# Patient Record
Sex: MALE | Race: WHITE | NOT HISPANIC OR LATINO
[De-identification: names, ages, dates, MRNs, and addresses within clinical notes are randomized per-mention and may not be internally consistent; named-entity substitution may affect disease eponyms.]

---

## 2019-08-08 PROBLEM — Z00.00 ENCOUNTER FOR PREVENTIVE HEALTH EXAMINATION: Status: ACTIVE | Noted: 2019-08-08

## 2019-08-26 ENCOUNTER — RECORD ABSTRACTING (OUTPATIENT)
Age: 74
End: 2019-08-26

## 2019-08-27 ENCOUNTER — APPOINTMENT (OUTPATIENT)
Dept: NEUROSURGERY | Facility: CLINIC | Age: 74
End: 2019-08-27
Payer: MEDICARE

## 2019-08-27 VITALS
SYSTOLIC BLOOD PRESSURE: 151 MMHG | DIASTOLIC BLOOD PRESSURE: 89 MMHG | WEIGHT: 185 LBS | HEIGHT: 68 IN | RESPIRATION RATE: 16 BRPM | BODY MASS INDEX: 28.04 KG/M2 | HEART RATE: 88 BPM

## 2019-08-27 PROCEDURE — 99204 OFFICE O/P NEW MOD 45 MIN: CPT

## 2019-08-30 ENCOUNTER — OUTPATIENT (OUTPATIENT)
Dept: OUTPATIENT SERVICES | Facility: HOSPITAL | Age: 74
LOS: 1 days | End: 2019-08-30
Payer: MEDICARE

## 2019-08-30 VITALS
RESPIRATION RATE: 16 BRPM | WEIGHT: 179.9 LBS | DIASTOLIC BLOOD PRESSURE: 90 MMHG | TEMPERATURE: 98 F | SYSTOLIC BLOOD PRESSURE: 148 MMHG | OXYGEN SATURATION: 96 % | HEIGHT: 63 IN | HEART RATE: 72 BPM

## 2019-08-30 DIAGNOSIS — G89.4 CHRONIC PAIN SYNDROME: ICD-10-CM

## 2019-08-30 DIAGNOSIS — Z01.818 ENCOUNTER FOR OTHER PREPROCEDURAL EXAMINATION: ICD-10-CM

## 2019-08-30 DIAGNOSIS — Z98.890 OTHER SPECIFIED POSTPROCEDURAL STATES: Chronic | ICD-10-CM

## 2019-08-30 DIAGNOSIS — M48.061 SPINAL STENOSIS, LUMBAR REGION WITHOUT NEUROGENIC CLAUDICATION: ICD-10-CM

## 2019-08-30 DIAGNOSIS — I10 ESSENTIAL (PRIMARY) HYPERTENSION: ICD-10-CM

## 2019-08-30 LAB
ANION GAP SERPL CALC-SCNC: 13 MMOL/L — SIGNIFICANT CHANGE UP (ref 5–17)
BUN SERPL-MCNC: 14 MG/DL — SIGNIFICANT CHANGE UP (ref 7–23)
CALCIUM SERPL-MCNC: 9.5 MG/DL — SIGNIFICANT CHANGE UP (ref 8.4–10.5)
CHLORIDE SERPL-SCNC: 97 MMOL/L — SIGNIFICANT CHANGE UP (ref 96–108)
CO2 SERPL-SCNC: 26 MMOL/L — SIGNIFICANT CHANGE UP (ref 22–31)
CREAT SERPL-MCNC: 0.81 MG/DL — SIGNIFICANT CHANGE UP (ref 0.5–1.3)
GLUCOSE SERPL-MCNC: 97 MG/DL — SIGNIFICANT CHANGE UP (ref 70–99)
HCT VFR BLD CALC: 42 % — SIGNIFICANT CHANGE UP (ref 39–50)
HGB BLD-MCNC: 13.9 G/DL — SIGNIFICANT CHANGE UP (ref 13–17)
MCHC RBC-ENTMCNC: 30.9 PG — SIGNIFICANT CHANGE UP (ref 27–34)
MCHC RBC-ENTMCNC: 33.1 GM/DL — SIGNIFICANT CHANGE UP (ref 32–36)
MCV RBC AUTO: 93.3 FL — SIGNIFICANT CHANGE UP (ref 80–100)
PLATELET # BLD AUTO: 210 K/UL — SIGNIFICANT CHANGE UP (ref 150–400)
POTASSIUM SERPL-MCNC: 4.5 MMOL/L — SIGNIFICANT CHANGE UP (ref 3.5–5.3)
POTASSIUM SERPL-SCNC: 4.5 MMOL/L — SIGNIFICANT CHANGE UP (ref 3.5–5.3)
RBC # BLD: 4.5 M/UL — SIGNIFICANT CHANGE UP (ref 4.2–5.8)
RBC # FLD: 11.8 % — SIGNIFICANT CHANGE UP (ref 10.3–14.5)
SODIUM SERPL-SCNC: 136 MMOL/L — SIGNIFICANT CHANGE UP (ref 135–145)
WBC # BLD: 7.84 K/UL — SIGNIFICANT CHANGE UP (ref 3.8–10.5)
WBC # FLD AUTO: 7.84 K/UL — SIGNIFICANT CHANGE UP (ref 3.8–10.5)

## 2019-08-30 PROCEDURE — 85027 COMPLETE CBC AUTOMATED: CPT

## 2019-08-30 PROCEDURE — 80048 BASIC METABOLIC PNL TOTAL CA: CPT

## 2019-08-30 PROCEDURE — 87640 STAPH A DNA AMP PROBE: CPT

## 2019-08-30 PROCEDURE — G0463: CPT

## 2019-08-30 PROCEDURE — 87641 MR-STAPH DNA AMP PROBE: CPT

## 2019-08-30 RX ORDER — SODIUM CHLORIDE 9 MG/ML
3 INJECTION INTRAMUSCULAR; INTRAVENOUS; SUBCUTANEOUS EVERY 8 HOURS
Refills: 0 | Status: DISCONTINUED | OUTPATIENT
Start: 2019-09-12 | End: 2019-09-12

## 2019-08-30 RX ORDER — CEFAZOLIN SODIUM 1 G
2000 VIAL (EA) INJECTION ONCE
Refills: 0 | Status: DISCONTINUED | OUTPATIENT
Start: 2019-09-12 | End: 2019-09-12

## 2019-08-30 RX ORDER — CHLORHEXIDINE GLUCONATE 213 G/1000ML
1 SOLUTION TOPICAL ONCE
Refills: 0 | Status: DISCONTINUED | OUTPATIENT
Start: 2019-09-12 | End: 2019-09-12

## 2019-08-30 RX ORDER — LIDOCAINE HCL 20 MG/ML
0.2 VIAL (ML) INJECTION ONCE
Refills: 0 | Status: DISCONTINUED | OUTPATIENT
Start: 2019-09-12 | End: 2019-09-12

## 2019-08-30 NOTE — H&P PST ADULT - NEGATIVE ENMT SYMPTOMS
no vertigo/no throat pain/no sinus symptoms/no nose bleeds/no nasal congestion no hearing difficulty/no ear pain/no nasal congestion/no throat pain/no nose bleeds/no tinnitus/no vertigo/no sinus symptoms

## 2019-08-30 NOTE — H&P PST ADULT - NSICDXPROBLEM_GEN_ALL_CORE_FT
PROBLEM DIAGNOSES  Problem: Lumbar spinal stenosis  Assessment and Plan: Spinal Cord Stimulator Placement  Pre-op instructions, including Chlorhexidine soap, provided - all questions answered  Continue pain meds as indicated    Problem: Hypertension  Assessment and Plan: Continue Lisinopril as indicated, including am of surgery with sip of water

## 2019-08-30 NOTE — PHYSICAL EXAM
[General Appearance - Alert] : alert [General Appearance - In No Acute Distress] : in no acute distress [General Appearance - Well Nourished] : well nourished [General Appearance - Well Developed] : well developed [Oriented To Time, Place, And Person] : oriented to person, place, and time [Impaired Insight] : insight and judgment were intact [Affect] : the affect was normal [Mood] : the mood was normal [Cranial Nerves Optic (II)] : visual acuity intact bilaterally,  pupils equal round and reactive to light [Cranial Nerves Oculomotor (III)] : extraocular motion intact [Cranial Nerves Facial (VII)] : face symmetrical [Cranial Nerves Trigeminal (V)] : facial sensation intact symmetrically [Cranial Nerves Vestibulocochlear (VIII)] : hearing was intact bilaterally [Cranial Nerves Glossopharyngeal (IX)] : tongue and palate midline [Cranial Nerves Hypoglossal (XII)] : there was no tongue deviation with protrusion [Cranial Nerves Accessory (XI - Cranial And Spinal)] : head turning and shoulder shrug symmetric [Motor Strength] : muscle strength was normal in all four extremities [5] : S1 ankle flexors 5/5 [Sensation Tactile Decrease] : light touch was intact [Limited Balance] : the patient's balance was impaired [No Visual Abnormalities] : no visible abnormailities [Antalgic] : antalgic [Intact] : all sensory within normal limits bilaterally [Hearing Threshold Finger Rub Not [place]] : hearing was normal [PERRL With Normal Accommodation] : pupils were equal in size, round, reactive to light, with normal accommodation [] : no respiratory distress [Neck Appearance] : the appearance of the neck was normal [Respiration, Rhythm And Depth] : normal respiratory rhythm and effort [Auscultation Breath Sounds / Voice Sounds] : lungs were clear to auscultation bilaterally [Heart Sounds] : normal S1 and S2 [Edema] : there was no peripheral edema [Involuntary Movements] : no involuntary movements were seen [Skin Color & Pigmentation] : normal skin color and pigmentation [Motor Tone] : muscle strength and tone were normal [Romberg's Sign] : Romberg's sign was negtive [Tremor] : no tremor present [FreeTextEntry8] : pain of RLE limiting  [Able to toe walk] : the patient was not able to toe walk [Able to heel walk] : the patient was not able to heel walk [FreeTextEntry1] : antalgic, limping gait

## 2019-08-30 NOTE — H&P PST ADULT - NSANTHOSAYNRD_GEN_A_CORE
No. RIOS screening performed.  STOP BANG Legend: 0-2 = LOW Risk; 3-4 = INTERMEDIATE Risk; 5-8 = HIGH Risk

## 2019-08-30 NOTE — HISTORY OF PRESENT ILLNESS
[FreeTextEntry1] : chronic pain [de-identified] : Mr. Mayen is a 73 yo male with PMH of HTN, R knee ruptured patella tendon, B cataracts, and chronic Low back and RLE pain with onset of 15 years who is s/p positive SCS trial on 8/23/19 and arrives for initial consult for permanent placement.  He reports 100 % relief of pain during the SCS trial.  He has been in pain management for 10 years, having received multiple injections with minimal partial relief.  Pain medications are not providing adequate relief.  He is ambulating with a cane which elicits severe pain of RLE. \par \par Pain:\par Low back pain\par Right LE pain - buttock, lateral thigh, slightly in calf\par Aggravated with ambulation/ weight bearing - causes pain in Right LE\par 50% low back, 50% RLE\par \par Pain management:  Dr. Conde has included multiple injections since 2017, and [id number] with another provider.\par PT is not helpful\par Pain medications:  Gabapentin 600 mg 1 tab Q6, Oxycodone 5 mg 1 tab TID\par

## 2019-08-30 NOTE — H&P PST ADULT - ENERGY EXPENDITURE (METS)
4.25 limited 2/2 low back pain, radiates to right LE 4.25 DASI scale, limited 2/2 low back pain, radiates to right LE

## 2019-08-30 NOTE — H&P PST ADULT - HISTORY OF PRESENT ILLNESS
75 yo male, 75 yo male, PMH HTN, HLD chronic low back pain since 1990, has tried epidural injections and PT thorough the years, but now not working and pain is radiating to right lower extremity making it difficult to walk. Pt. presents to PST today for scheduled Spinal Cord Stimulator Placement on 9/12/19. Pt. denies recent fever, chills, chest pain, SOB, changes in bowel/urinary habits.  Pt. will continue aspirin during ernesto-op period

## 2019-08-30 NOTE — H&P PST ADULT - ACTIVITY
Can walk 2 blocks, goes up the stairs one step at a time Can walk 2 blocks with cane, goes up the stairs one step at a time

## 2019-08-30 NOTE — REVIEW OF SYSTEMS
[Leg Weakness] : leg weakness [Abnormal Sensation] : an abnormal sensation [Hypersensitivity] : hypersensitivity [Difficulty Walking] : difficulty walking [Limping] : limping [As Noted in HPI] : as noted in HPI [Limb Pain] : limb pain [Negative] : Endocrine [Abdominal Pain] : no abdominal pain [Vomiting] : no vomiting [Diarrhea] : no diarrhea [Dysuria] : no dysuria [Incontinence] : no incontinence [Skin Lesions] : no skin lesions [Skin Wound] : no skin wound [Itching] : no itching [Easy Bleeding] : no tendency for easy bleeding [Easy Bruising] : no tendency for easy bruising [FreeTextEntry9] : severe RLE pain

## 2019-08-30 NOTE — ASSESSMENT
[FreeTextEntry1] : Mr. Mayen has severe lumbar stenosis causing severe chronic pain.  He has failed conservative pain management including multiple injections over 10 years.  He has failed medication therapy with minimal partial relief. He reports 100 % relief during recent trial of SCS.  He does have some pressure on the nerves but does not wish to have a large back surgery.  He is an excellent candidate for permanent SCS for optimal pain control and improved quality of life since having 100 % relief of pain.  After discussion of the risks and benefits of permanent SCS implant, he wishes to proceed.  We will obtain an MRI of the lumbar spine to evaluate the soft tissue, nerves, and spinal cord (last available from 2012).  \par \par 1)  MRI Lumbar wo - now\par 2)  Schedule SCS permanent / BSI

## 2019-08-31 LAB
MRSA PCR RESULT.: SIGNIFICANT CHANGE UP
S AUREUS DNA NOSE QL NAA+PROBE: SIGNIFICANT CHANGE UP

## 2019-09-09 ENCOUNTER — OUTPATIENT (OUTPATIENT)
Dept: OUTPATIENT SERVICES | Facility: HOSPITAL | Age: 74
LOS: 1 days | End: 2019-09-09
Payer: MEDICARE

## 2019-09-09 ENCOUNTER — APPOINTMENT (OUTPATIENT)
Dept: MRI IMAGING | Facility: CLINIC | Age: 74
End: 2019-09-09

## 2019-09-09 DIAGNOSIS — Z98.890 OTHER SPECIFIED POSTPROCEDURAL STATES: Chronic | ICD-10-CM

## 2019-09-09 DIAGNOSIS — M47.26 OTHER SPONDYLOSIS WITH RADICULOPATHY, LUMBAR REGION: ICD-10-CM

## 2019-09-09 DIAGNOSIS — Z00.8 ENCOUNTER FOR OTHER GENERAL EXAMINATION: ICD-10-CM

## 2019-09-09 DIAGNOSIS — M51.16 INTERVERTEBRAL DISC DISORDERS WITH RADICULOPATHY, LUMBAR REGION: ICD-10-CM

## 2019-09-09 PROBLEM — M48.061 SPINAL STENOSIS, LUMBAR REGION WITHOUT NEUROGENIC CLAUDICATION: Chronic | Status: ACTIVE | Noted: 2019-08-30

## 2019-09-09 PROBLEM — M54.16 RADICULOPATHY, LUMBAR REGION: Chronic | Status: ACTIVE | Noted: 2019-08-30

## 2019-09-09 PROCEDURE — 72148 MRI LUMBAR SPINE W/O DYE: CPT | Mod: 26

## 2019-09-09 PROCEDURE — 72148 MRI LUMBAR SPINE W/O DYE: CPT

## 2019-09-11 ENCOUNTER — TRANSCRIPTION ENCOUNTER (OUTPATIENT)
Age: 74
End: 2019-09-11

## 2019-09-12 ENCOUNTER — APPOINTMENT (OUTPATIENT)
Dept: NEUROSURGERY | Facility: HOSPITAL | Age: 74
End: 2019-09-12

## 2019-09-12 ENCOUNTER — OUTPATIENT (OUTPATIENT)
Dept: INPATIENT UNIT | Facility: HOSPITAL | Age: 74
LOS: 1 days | Discharge: HOME | End: 2019-09-12
Payer: MEDICARE

## 2019-09-12 VITALS
DIASTOLIC BLOOD PRESSURE: 84 MMHG | HEART RATE: 77 BPM | SYSTOLIC BLOOD PRESSURE: 166 MMHG | OXYGEN SATURATION: 97 % | HEIGHT: 63 IN | TEMPERATURE: 98 F | WEIGHT: 179.9 LBS | RESPIRATION RATE: 18 BRPM

## 2019-09-12 VITALS
RESPIRATION RATE: 18 BRPM | TEMPERATURE: 98 F | HEART RATE: 78 BPM | SYSTOLIC BLOOD PRESSURE: 143 MMHG | DIASTOLIC BLOOD PRESSURE: 69 MMHG | OXYGEN SATURATION: 96 %

## 2019-09-12 DIAGNOSIS — Z98.890 OTHER SPECIFIED POSTPROCEDURAL STATES: Chronic | ICD-10-CM

## 2019-09-12 DIAGNOSIS — G89.4 CHRONIC PAIN SYNDROME: ICD-10-CM

## 2019-09-12 RX ORDER — LISINOPRIL 2.5 MG/1
20 TABLET ORAL DAILY
Refills: 0 | Status: DISCONTINUED | OUTPATIENT
Start: 2019-09-12 | End: 2019-09-12

## 2019-09-12 RX ORDER — ASPIRIN/CALCIUM CARB/MAGNESIUM 324 MG
81 TABLET ORAL
Qty: 0 | Refills: 0 | DISCHARGE

## 2019-09-12 RX ORDER — ATORVASTATIN CALCIUM 80 MG/1
1 TABLET, FILM COATED ORAL
Qty: 0 | Refills: 0 | DISCHARGE

## 2019-09-12 RX ORDER — AZTREONAM 2 G
160 VIAL (EA) INJECTION
Qty: 1600 | Refills: 0
Start: 2019-09-12 | End: 2019-09-16

## 2019-09-12 RX ORDER — GABAPENTIN 400 MG/1
600 CAPSULE ORAL
Refills: 0 | Status: DISCONTINUED | OUTPATIENT
Start: 2019-09-12 | End: 2019-09-12

## 2019-09-12 RX ORDER — LISINOPRIL 2.5 MG/1
1 TABLET ORAL
Qty: 0 | Refills: 0 | DISCHARGE

## 2019-09-12 RX ORDER — HYDROMORPHONE HYDROCHLORIDE 2 MG/ML
0.5 INJECTION INTRAMUSCULAR; INTRAVENOUS; SUBCUTANEOUS
Refills: 0 | Status: DISCONTINUED | OUTPATIENT
Start: 2019-09-12 | End: 2019-09-12

## 2019-09-12 RX ORDER — OXYCODONE AND ACETAMINOPHEN 5; 325 MG/1; MG/1
1 TABLET ORAL EVERY 6 HOURS
Refills: 0 | Status: DISCONTINUED | OUTPATIENT
Start: 2019-09-12 | End: 2019-09-12

## 2019-09-12 RX ORDER — SODIUM CHLORIDE 9 MG/ML
1000 INJECTION INTRAMUSCULAR; INTRAVENOUS; SUBCUTANEOUS
Refills: 0 | Status: DISCONTINUED | OUTPATIENT
Start: 2019-09-12 | End: 2019-09-12

## 2019-09-12 RX ORDER — ATORVASTATIN CALCIUM 80 MG/1
10 TABLET, FILM COATED ORAL AT BEDTIME
Refills: 0 | Status: DISCONTINUED | OUTPATIENT
Start: 2019-09-12 | End: 2019-09-12

## 2019-09-12 RX ORDER — GABAPENTIN 400 MG/1
1 CAPSULE ORAL
Qty: 0 | Refills: 0 | DISCHARGE

## 2019-09-12 RX ADMIN — SODIUM CHLORIDE 75 MILLILITER(S): 9 INJECTION INTRAMUSCULAR; INTRAVENOUS; SUBCUTANEOUS at 10:45

## 2019-09-12 NOTE — ASU DISCHARGE PLAN (ADULT/PEDIATRIC) - ASU DC SPECIAL INSTRUCTIONSFT
No strenuous activity. No heavy lifting. Do not return to work until cleared by physician. No driving until cleared by physician.  You may remove any dressing and shower on the 3rd day after you surgery.  No soaking in tub, do not remove steri strips. Do not apply any ointments to incision.    Call your Neurosurgeon for appointment in 1 week for wound check.  Follow up with your Private MD in 1-2 weeks.  Return to Emergency Department or contact your Neurosurgeon if any changes in mental status, weakness, numbness or tingling of extremities; difficulty swallowing; drainage or redness of wound, fever; pain in legs; difficulty urinating or constipation.  Do NOT restart your Aspirin or take any Motrin/NSAIDS until checking with your Neurosurgeon and instructed to do so.

## 2019-09-13 PROCEDURE — 63685 INS/RPLC SPI NPG/RCVR POCKET: CPT

## 2019-09-13 PROCEDURE — C1778: CPT

## 2019-09-13 PROCEDURE — 72148 MRI LUMBAR SPINE W/O DYE: CPT

## 2019-09-13 PROCEDURE — C1889: CPT

## 2019-09-13 PROCEDURE — C1820: CPT

## 2019-09-13 PROCEDURE — 63650 IMPLANT NEUROELECTRODES: CPT

## 2019-09-13 PROCEDURE — C1787: CPT

## 2019-09-13 PROCEDURE — 76000 FLUOROSCOPY <1 HR PHYS/QHP: CPT

## 2019-09-17 ENCOUNTER — APPOINTMENT (OUTPATIENT)
Dept: NEUROSURGERY | Facility: CLINIC | Age: 74
End: 2019-09-17
Payer: MEDICARE

## 2019-09-17 VITALS
HEIGHT: 68 IN | WEIGHT: 185 LBS | HEART RATE: 81 BPM | DIASTOLIC BLOOD PRESSURE: 72 MMHG | BODY MASS INDEX: 28.04 KG/M2 | SYSTOLIC BLOOD PRESSURE: 146 MMHG

## 2019-09-17 DIAGNOSIS — M54.16 RADICULOPATHY, LUMBAR REGION: ICD-10-CM

## 2019-09-17 DIAGNOSIS — Z87.891 PERSONAL HISTORY OF NICOTINE DEPENDENCE: ICD-10-CM

## 2019-09-17 DIAGNOSIS — M48.062 SPINAL STENOSIS, LUMBAR REGION WITH NEUROGENIC CLAUDICATION: ICD-10-CM

## 2019-09-17 DIAGNOSIS — Z98.890 OTHER SPECIFIED POSTPROCEDURAL STATES: ICD-10-CM

## 2019-09-17 PROCEDURE — 99024 POSTOP FOLLOW-UP VISIT: CPT

## 2019-09-17 PROCEDURE — 95972 ALYS CPLX SP/PN NPGT W/PRGRM: CPT

## 2019-09-24 PROBLEM — Z98.890 S/P INSERTION OF SPINAL CORD STIMULATOR: Status: ACTIVE | Noted: 2019-09-17

## 2019-09-24 PROBLEM — M54.16 LUMBAR RADICULOPATHY, CHRONIC: Status: ACTIVE | Noted: 2019-08-27

## 2019-09-24 PROBLEM — M48.062 SPINAL STENOSIS OF LUMBAR REGION WITH NEUROGENIC CLAUDICATION: Status: ACTIVE | Noted: 2019-08-27

## 2019-09-24 PROBLEM — Z87.891 FORMER SMOKER: Status: ACTIVE | Noted: 2019-08-27

## 2019-09-27 NOTE — REVIEW OF SYSTEMS
[Abnormal Sensation] : an abnormal sensation [Leg Weakness] : leg weakness [Difficulty Walking] : difficulty walking [Hypersensitivity] : hypersensitivity [Limping] : limping [As Noted in HPI] : as noted in HPI [Limb Pain] : limb pain [Negative] : Endocrine [Abdominal Pain] : no abdominal pain [Vomiting] : no vomiting [Diarrhea] : no diarrhea [Dysuria] : no dysuria [Incontinence] : no incontinence [Skin Lesions] : no skin lesions [Skin Wound] : no skin wound [Itching] : no itching [Easy Bleeding] : no tendency for easy bleeding [Easy Bruising] : no tendency for easy bruising [FreeTextEntry9] : severe RLE pain

## 2019-09-27 NOTE — HISTORY OF PRESENT ILLNESS
[> 3 months] : more  than 3 months [FreeTextEntry1] : chronic pain [de-identified] : Mr. Mayen is a 75 yo male with PMH of HTN, R knee ruptured patella tendon, B cataracts, chronic Low back and RLE pain with onset of 15 years who is s/p  9/12/19 SCS perm/ perc/ BSI leads @ T8/ Left Flank IPG.  Initial programming to be done today with rep present.  The surgical incisions are clean, cry, intact, well approximated, with prineo/dermabond intact, without signs of irritation, erythema, edema, warmth, or drainage.  \par \par He reported 100 % relief of pain during the SCS trial.  He has been in pain management for 10 years, having received multiple injections with minimal partial relief.  Pain medications are not providing adequate relief.  He is ambulating with a cane which elicits severe pain of RLE. \par \par Baseline Pain:\par Low back pain\par Right LE pain - buttock, lateral thigh, slightly in calf\par Aggravated with ambulation/ weight bearing - causes pain in Right LE\par 50% low back, 50% RLE\par \par Pain management:  Dr. Coned has included multiple injections since 2017, and [id number] with another provider.\par PT is not helpful\par Pain medications:  Gabapentin 600 mg 1 tab Q6, Oxycodone 5 mg 1 tab TID\par

## 2019-09-27 NOTE — ASSESSMENT
[FreeTextEntry1] : Mr. Mayen's surgical incisions remain intact, with prineo/dermabond intact and reviewed standard post op wound care instructions as given upon discharge as well from surgery.  Initial programming today with BSI rep with good results as expected.  He knows to keep scheduled f/u with pain management as well for medical management and ongoing programming as needed.  We are available for any issues.  We will reassess in 3 months and check the incisions at that time.  We will also obtain Xrays to confirm lead placement in 3 months.\par \par 1)  Xrays Thoracic A/P Lat - 3 months\par 2)_RTO 3 months

## 2019-09-27 NOTE — PHYSICAL EXAM
[General Appearance - Alert] : alert [General Appearance - In No Acute Distress] : in no acute distress [General Appearance - Well Developed] : well developed [Oriented To Time, Place, And Person] : oriented to person, place, and time [General Appearance - Well Nourished] : well nourished [Affect] : the affect was normal [Impaired Insight] : insight and judgment were intact [Mood] : the mood was normal [Cranial Nerves Oculomotor (III)] : extraocular motion intact [Cranial Nerves Optic (II)] : visual acuity intact bilaterally,  pupils equal round and reactive to light [Cranial Nerves Facial (VII)] : face symmetrical [Cranial Nerves Trigeminal (V)] : facial sensation intact symmetrically [Cranial Nerves Vestibulocochlear (VIII)] : hearing was intact bilaterally [Cranial Nerves Glossopharyngeal (IX)] : tongue and palate midline [Cranial Nerves Accessory (XI - Cranial And Spinal)] : head turning and shoulder shrug symmetric [Cranial Nerves Hypoglossal (XII)] : there was no tongue deviation with protrusion [Motor Strength] : muscle strength was normal in all four extremities [Sensation Tactile Decrease] : light touch was intact [5] : S1 ankle flexors 5/5 [Limited Balance] : the patient's balance was impaired [Antalgic] : antalgic [No Visual Abnormalities] : no visible abnormailities [Intact] : all motor groups within normal limits of strength and tone bilaterally [Hearing Threshold Finger Rub Not [place]] : hearing was normal [PERRL With Normal Accommodation] : pupils were equal in size, round, reactive to light, with normal accommodation [Neck Appearance] : the appearance of the neck was normal [] : no respiratory distress [Respiration, Rhythm And Depth] : normal respiratory rhythm and effort [Auscultation Breath Sounds / Voice Sounds] : lungs were clear to auscultation bilaterally [Heart Sounds] : normal S1 and S2 [Edema] : there was no peripheral edema [Involuntary Movements] : no involuntary movements were seen [Motor Tone] : muscle strength and tone were normal [Skin Color & Pigmentation] : normal skin color and pigmentation [Romberg's Sign] : Romberg's sign was negtive [Tremor] : no tremor present [FreeTextEntry8] : pain of RLE limiting  [Able to toe walk] : the patient was not able to toe walk [Able to heel walk] : the patient was not able to heel walk [FreeTextEntry1] : antalgic, limping gait

## 2019-12-09 ENCOUNTER — FORM ENCOUNTER (OUTPATIENT)
Age: 74
End: 2019-12-09

## 2019-12-10 ENCOUNTER — OUTPATIENT (OUTPATIENT)
Dept: OUTPATIENT SERVICES | Facility: HOSPITAL | Age: 74
LOS: 1 days | End: 2019-12-10
Payer: MEDICARE

## 2019-12-10 ENCOUNTER — APPOINTMENT (OUTPATIENT)
Dept: RADIOLOGY | Facility: CLINIC | Age: 74
End: 2019-12-10
Payer: MEDICARE

## 2019-12-10 DIAGNOSIS — Z98.890 OTHER SPECIFIED POSTPROCEDURAL STATES: Chronic | ICD-10-CM

## 2019-12-10 DIAGNOSIS — Z00.8 ENCOUNTER FOR OTHER GENERAL EXAMINATION: ICD-10-CM

## 2019-12-10 PROCEDURE — 72070 X-RAY EXAM THORAC SPINE 2VWS: CPT

## 2019-12-10 PROCEDURE — 72070 X-RAY EXAM THORAC SPINE 2VWS: CPT | Mod: 26

## 2019-12-12 ENCOUNTER — OTHER (OUTPATIENT)
Age: 74
End: 2019-12-12

## 2019-12-12 NOTE — REASON FOR VISIT
[Follow-Up: _____] : a [unfilled] follow-up visit [FreeTextEntry1] : 3 month post op follow up for permanent SCS implant (BSI @ T8) to review X-ray T-spine (on 12/10/19 in PACS)

## 2019-12-12 NOTE — HISTORY OF PRESENT ILLNESS
[> 3 months] : more  than 3 months [FreeTextEntry1] : chronic pain [de-identified] : Mr. Mayen is a 75 yo male with PMH of HTN, R knee ruptured patella tendon, B cataracts, chronic Low back and RLE pain with onset of 15 years who is s/p  9/12/19 SCS perm/ perc/ BSI leads @ T8/ Left Flank IPG.  He returns today for 3 months follow up\par \par He reported 100 % relief of pain during the SCS trial.  He has been in pain management for 10 years, having received multiple injections with minimal partial relief.  Pain medications are not providing adequate relief.  He is ambulating with a cane which elicits severe pain of RLE. \par \par Baseline Pain:\par Low back pain\par Right LE pain - buttock, lateral thigh, slightly in calf\par Aggravated with ambulation/ weight bearing - causes pain in Right LE\par 50% low back, 50% RLE\par \par Pain management:  Dr. Conde has included multiple injections since 2017, and [id number] with another provider.\par PT is not helpful\par Pain medications:  Gabapentin 600 mg 1 tab Q6, Oxycodone 5 mg 1 tab TID\par

## 2019-12-17 ENCOUNTER — APPOINTMENT (OUTPATIENT)
Dept: NEUROSURGERY | Facility: CLINIC | Age: 74
End: 2019-12-17
Payer: MEDICARE

## 2019-12-30 ENCOUNTER — APPOINTMENT (OUTPATIENT)
Dept: NEUROSURGERY | Facility: CLINIC | Age: 74
End: 2019-12-30
Payer: MEDICARE

## 2019-12-30 PROCEDURE — 99212 OFFICE O/P EST SF 10 MIN: CPT

## 2022-01-27 NOTE — PRE-OP CHECKLIST - 1.
Preop teaching done and emotional support provided to patient and family. Safety provided. Metronidazole Pregnancy And Lactation Text: This medication is Pregnancy Category B and considered safe during pregnancy.  It is also excreted in breast milk.

## 2024-08-08 ENCOUNTER — APPOINTMENT (OUTPATIENT)
Dept: PULMONOLOGY | Facility: CLINIC | Age: 79
End: 2024-08-08

## 2024-08-08 PROBLEM — Z86.39 HISTORY OF HYPERCHOLESTEROLEMIA: Status: RESOLVED | Noted: 2024-08-08 | Resolved: 2024-08-08

## 2024-08-08 PROCEDURE — G2211 COMPLEX E/M VISIT ADD ON: CPT

## 2024-08-08 PROCEDURE — 99204 OFFICE O/P NEW MOD 45 MIN: CPT

## 2024-08-08 NOTE — PROCEDURE
[FreeTextEntry1] : CT CHEST WO IV CONTRAST: 7/10/2024 12:40 PM INDICATION: Pneumonia, unspecified organism. Other specified pleural conditions. Fluid in chest cavity associated with lung infection, dysphagia TECHNIQUE: CT of the chest without intravenous contrast was performed using highresolution protocol in the axial plane with coronal and sagittal reconstructions. COMPARISON: Comparison made with previous examination(s) dated (CR chest xray images only without report) 2020 FINDINGS: Please note that absence of intravenous contrast decreases sensitivity for certain pathology. CHEST: TRACHEOBRONCHIAL TREE: Patent and normal in caliber. LUNGS: There is a 2 mm rounded noncalcified nodule lateral left lung base, series 4 image 108. There is atelectatic changes in the lingula with somewhat subsegmental consolidative lung segment with air bronchograms posterior laterally, series 4 image 104. There is an area of discoid linear atelectasis and/or scarring anterior medial right upper chest. PLEURA: No pleural effusion or pneumothorax. HEART: Normal in size. No pericardial effusion. Extensive calcified coronary arterial atheromatous plaque. VESSELS: Mild calcified atheromatous plaque changes thoracic aorta fusiform dilation 4 cm in short axis ascending aorta at the level of the pulmonary outflow tract. LYMPH NODES: No suspicious lymphadenopathy, noting limited evaluation of the tommie without contrast. THYROID: Visualized portions unremarkable. ESOPHAGUS: Normal in caliber and wall thickness. UPPER ABDOMEN: Several calcified stones collecting in the dependent gallbladder with size up to 8 mm. There is no gallbladder wall thickening or pericholecystic fat stranding. There is a simple cyst projecting from the upper left kidney 2.5 cm in size. Pancreas is fatty replaced atrophic. There is somewhat amorphous nodular thickening of the left Report title: CT CT CHEST WO IV CONTRAST - 2024 5:58 am Patient Id: [id number][DEFAULT] Patient name: Madan Mayen :  Exam date: 7/10/2024 12:40 pm Report status: Final 24, 12:43 PM [facility] Viewer [email or web address] [email or web address] adrenal gland without definite contour deforming mass. MUSCULOSKELETAL: No suspicious osseous lesions. Marked degenerative changes of the spine. IMPRESSION: Atelectatic, subsegmental consolidative lung changes lingula. The change of appearance of which is indeterminate. Consider progress exam in follow-up. There is no pleural effusion. Tiny noncalcified nodule lateral left lung base at present too small to adequately characterize. Appropriate follow-up as per Fleischner guidelines. Discoid atelectasis and/or scarring anterior medial right upper lung. Calcified coronary arterial atheromatous plaque. Heart size is not enlarged. Fusiform dilation ascending thoracic aorta 4 cm in short axis. Correlate with echocardiography. Noncomplicated calcified gallstones. Simple cyst upper posterior left kidney. Electronically signed: 2024 05:58 AM Lupillo Pham MD Ref. Physician: DAMARIS GOEL MD NPI: [id number] data reviewed:

## 2024-08-08 NOTE — PHYSICAL EXAM
[No Acute Distress] : no acute distress [No Resp Distress] : no resp distress [No Acc Muscle Use] : no acc muscle use [Clear to Auscultation Bilaterally] : clear to auscultation bilaterally [Oriented x3] : oriented x3 Patient was BIB parents to ED. She appeared disorganized and agitated and required IM PRN Haldol 5, Ativan 2, Benadryl 50.   Vital Signs Last 24 Hrs  T(C): 37.7 (29 Nov 2019 05:47), Max: 37.7 (29 Nov 2019 05:47)  T(F): 99.9 (29 Nov 2019 05:47), Max: 99.9 (29 Nov 2019 05:47)  HR: 96 (29 Nov 2019 06:36) (95 - 96)  BP: 153/99 (29 Nov 2019 06:36) (153/99 - 154/83)  BP(mean): --  RR: 20 (29 Nov 2019 06:36) (17 - 20)  SpO2: 100% (29 Nov 2019 06:36) (100% - 100%) asthma single. 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 2 younger sisters.

## 2024-08-08 NOTE — HISTORY OF PRESENT ILLNESS
[>= 20 pack years] : >= 20 pack years [TextBox_4] : JAYLYN HERNANDEZ is a 79 year old male who presents with son widenice abnormal CT chest PMH: HTN, back pain, dysphagia,  s/p egd and EUS with dilation by GI , distal esophageal narrowin g feb 2024  told he had 'water in chest'  ct chest proheatlh done  heavy smoker 4 packs X 20 years, quit 40 years ago  [TextBox_11] : 4 [TextBox_13] : 20 [YearQuit] : 1980

## 2024-09-26 ENCOUNTER — APPOINTMENT (OUTPATIENT)
Dept: PULMONOLOGY | Facility: CLINIC | Age: 79
End: 2024-09-26
Payer: MEDICARE

## 2024-09-26 VITALS
SYSTOLIC BLOOD PRESSURE: 170 MMHG | OXYGEN SATURATION: 97 % | WEIGHT: 160 LBS | BODY MASS INDEX: 25.11 KG/M2 | TEMPERATURE: 98.2 F | HEIGHT: 67 IN | HEART RATE: 75 BPM | DIASTOLIC BLOOD PRESSURE: 78 MMHG

## 2024-09-26 DIAGNOSIS — R93.89 ABNORMAL FINDINGS ON DIAGNOSTIC IMAGING OF OTHER SPECIFIED BODY STRUCTURES: ICD-10-CM

## 2024-09-26 DIAGNOSIS — J44.9 CHRONIC OBSTRUCTIVE PULMONARY DISEASE, UNSPECIFIED: ICD-10-CM

## 2024-09-26 PROCEDURE — 99214 OFFICE O/P EST MOD 30 MIN: CPT | Mod: 25

## 2024-09-26 PROCEDURE — 94010 BREATHING CAPACITY TEST: CPT

## 2024-09-26 NOTE — HISTORY OF PRESENT ILLNESS
[>= 20 pack years] : >= 20 pack years [TextBox_4] : JAYLYN HERNANDEZ is a 79 year old male who presents with son for f/u  seen for abnormal CT 1 moth ago PMH: HTN, back pain, dysphagia,  s/p egd and EUS with dilation by GI , distal esophageal narrowin g feb 2024   ct chest proheatlh done  heavy smoker 4 packs X 20 years, quit 40 years ago  [TextBox_11] : 4 [TextBox_13] : 20 [YearQuit] : 1980

## 2024-09-26 NOTE — PROCEDURE
[FreeTextEntry1] : 2024- obstruction mild/modreate   CT CHEST WO IV CONTRAST: 7/10/2024 12:40 PM INDICATION: Pneumonia, unspecified organism. Other specified pleural conditions. Fluid in chest cavity associated with lung infection, dysphagia TECHNIQUE: CT of the chest without intravenous contrast was performed using highresolution protocol in the axial plane with coronal and sagittal reconstructions. COMPARISON: Comparison made with previous examination(s) dated (CR chest xray images only without report) 2020 FINDINGS: Please note that absence of intravenous contrast decreases sensitivity for certain pathology. CHEST: TRACHEOBRONCHIAL TREE: Patent and normal in caliber. LUNGS: There is a 2 mm rounded noncalcified nodule lateral left lung base, series 4 image 108. There is atelectatic changes in the lingula with somewhat subsegmental consolidative lung segment with air bronchograms posterior laterally, series 4 image 104. There is an area of discoid linear atelectasis and/or scarring anterior medial right upper chest. PLEURA: No pleural effusion or pneumothorax. HEART: Normal in size. No pericardial effusion. Extensive calcified coronary arterial atheromatous plaque. VESSELS: Mild calcified atheromatous plaque changes thoracic aorta fusiform dilation 4 cm in short axis ascending aorta at the level of the pulmonary outflow tract. LYMPH NODES: No suspicious lymphadenopathy, noting limited evaluation of the tommie without contrast. THYROID: Visualized portions unremarkable. ESOPHAGUS: Normal in caliber and wall thickness. UPPER ABDOMEN: Several calcified stones collecting in the dependent gallbladder with size up to 8 mm. There is no gallbladder wall thickening or pericholecystic fat stranding. There is a simple cyst projecting from the upper left kidney 2.5 cm in size. Pancreas is fatty replaced atrophic. There is somewhat amorphous nodular thickening of the left Report title: CT CT CHEST WO IV CONTRAST - 2024 5:58 am Patient Id: [id number][DEFAULT] Patient name: Madan Mayen :  Exam date: 7/10/2024 12:40 pm Report status: Final 24, 12:43 PM Change [employer] Viewer [email or web address] [email or web address] adrenal gland without definite contour deforming mass. MUSCULOSKELETAL: No suspicious osseous lesions. Marked degenerative changes of the spine. IMPRESSION: Atelectatic, subsegmental consolidative lung changes lingula. The change of appearance of which is indeterminate. Consider progress exam in follow-up. There is no pleural effusion. Tiny noncalcified nodule lateral left lung base at present too small to adequately characterize. Appropriate follow-up as per Fleischner guidelines. Discoid atelectasis and/or scarring anterior medial right upper lung. Calcified coronary arterial atheromatous plaque. Heart size is not enlarged. Fusiform dilation ascending thoracic aorta 4 cm in short axis. Correlate with echocardiography. Noncomplicated calcified gallstones. Simple cyst upper posterior left kidney. Electronically signed: 2024 05:58 AM Lupillo Pham MD Ref. Physician: DAMARIS GOEL MD NPI: [id number] data reviewed:

## 2024-09-27 RX ORDER — UMECLIDINIUM BROMIDE AND VILANTEROL TRIFENATATE 62.5; 25 UG/1; UG/1
62.5-25 POWDER RESPIRATORY (INHALATION)
Qty: 180 | Refills: 0 | Status: ACTIVE | COMMUNITY
Start: 2024-09-26 | End: 1900-01-01

## 2024-10-11 ENCOUNTER — NON-APPOINTMENT (OUTPATIENT)
Age: 79
End: 2024-10-11

## 2024-10-14 ENCOUNTER — RX RENEWAL (OUTPATIENT)
Age: 79
End: 2024-10-14

## 2024-10-16 ENCOUNTER — RX RENEWAL (OUTPATIENT)
Age: 79
End: 2024-10-16

## 2024-10-16 RX ORDER — FLUTICASONE PROPIONATE AND SALMETEROL 250; 50 UG/1; UG/1
250-50 POWDER RESPIRATORY (INHALATION)
Qty: 180 | Refills: 0 | Status: ACTIVE | COMMUNITY
Start: 2024-10-12 | End: 1900-01-01

## 2025-06-28 NOTE — ASU DISCHARGE PLAN (ADULT/PEDIATRIC) - CARE PROVIDER_API CALL
Goal Outcome Evaluation:         Lavelle Zambrano will sleep well tonight.                      Clifford Olguin)  Neurosurgery  General  [address] [facility], [address]  [place] [address]  Phone: [phone]  Fax: [phone]  Follow Up Time:

## 2025-07-01 ENCOUNTER — OUTPATIENT (OUTPATIENT)
Dept: OUTPATIENT SERVICES | Facility: HOSPITAL | Age: 80
LOS: 1 days | End: 2025-07-01
Payer: MEDICARE

## 2025-07-01 ENCOUNTER — TRANSCRIPTION ENCOUNTER (OUTPATIENT)
Age: 80
End: 2025-07-01

## 2025-07-01 VITALS
TEMPERATURE: 98 F | DIASTOLIC BLOOD PRESSURE: 84 MMHG | OXYGEN SATURATION: 98 % | HEART RATE: 71 BPM | HEIGHT: 67 IN | WEIGHT: 179.02 LBS | SYSTOLIC BLOOD PRESSURE: 184 MMHG | RESPIRATION RATE: 16 BRPM

## 2025-07-01 VITALS
RESPIRATION RATE: 17 BRPM | HEART RATE: 75 BPM | SYSTOLIC BLOOD PRESSURE: 158 MMHG | DIASTOLIC BLOOD PRESSURE: 75 MMHG | OXYGEN SATURATION: 97 %

## 2025-07-01 DIAGNOSIS — R94.39 ABNORMAL RESULT OF OTHER CARDIOVASCULAR FUNCTION STUDY: ICD-10-CM

## 2025-07-01 DIAGNOSIS — Z98.890 OTHER SPECIFIED POSTPROCEDURAL STATES: Chronic | ICD-10-CM

## 2025-07-01 LAB
ANION GAP SERPL CALC-SCNC: 16 MMOL/L — SIGNIFICANT CHANGE UP (ref 5–17)
BUN SERPL-MCNC: 16 MG/DL — SIGNIFICANT CHANGE UP (ref 7–23)
CALCIUM SERPL-MCNC: 8.9 MG/DL — SIGNIFICANT CHANGE UP (ref 8.4–10.5)
CHLORIDE SERPL-SCNC: 97 MMOL/L — SIGNIFICANT CHANGE UP (ref 96–108)
CO2 SERPL-SCNC: 28 MMOL/L — SIGNIFICANT CHANGE UP (ref 22–31)
CREAT SERPL-MCNC: 1.05 MG/DL — SIGNIFICANT CHANGE UP (ref 0.5–1.3)
EGFR: 71 ML/MIN/1.73M2 — SIGNIFICANT CHANGE UP
EGFR: 71 ML/MIN/1.73M2 — SIGNIFICANT CHANGE UP
GLUCOSE SERPL-MCNC: 105 MG/DL — HIGH (ref 70–99)
HCT VFR BLD CALC: 37.1 % — LOW (ref 39–50)
HGB BLD-MCNC: 12.3 G/DL — LOW (ref 13–17)
MCHC RBC-ENTMCNC: 29.5 PG — SIGNIFICANT CHANGE UP (ref 27–34)
MCHC RBC-ENTMCNC: 33.2 G/DL — SIGNIFICANT CHANGE UP (ref 32–36)
MCV RBC AUTO: 89 FL — SIGNIFICANT CHANGE UP (ref 80–100)
NRBC # BLD AUTO: 0 K/UL — SIGNIFICANT CHANGE UP (ref 0–0)
NRBC # FLD: 0 K/UL — SIGNIFICANT CHANGE UP (ref 0–0)
NRBC BLD AUTO-RTO: 0 /100 WBCS — SIGNIFICANT CHANGE UP (ref 0–0)
PLATELET # BLD AUTO: 181 K/UL — SIGNIFICANT CHANGE UP (ref 150–400)
PMV BLD: 9.9 FL — SIGNIFICANT CHANGE UP (ref 7–13)
POTASSIUM SERPL-MCNC: 2.8 MMOL/L — CRITICAL LOW (ref 3.5–5.3)
POTASSIUM SERPL-SCNC: 2.8 MMOL/L — CRITICAL LOW (ref 3.5–5.3)
RBC # BLD: 4.17 M/UL — LOW (ref 4.2–5.8)
RBC # FLD: 12.8 % — SIGNIFICANT CHANGE UP (ref 10.3–14.5)
SODIUM SERPL-SCNC: 141 MMOL/L — SIGNIFICANT CHANGE UP (ref 135–145)
WBC # BLD: 6.31 K/UL — SIGNIFICANT CHANGE UP (ref 3.8–10.5)
WBC # FLD AUTO: 6.31 K/UL — SIGNIFICANT CHANGE UP (ref 3.8–10.5)

## 2025-07-01 PROCEDURE — 93010 ELECTROCARDIOGRAM REPORT: CPT

## 2025-07-01 PROCEDURE — 93005 ELECTROCARDIOGRAM TRACING: CPT

## 2025-07-01 PROCEDURE — 93454 CORONARY ARTERY ANGIO S&I: CPT | Mod: 26

## 2025-07-01 PROCEDURE — 99152 MOD SED SAME PHYS/QHP 5/>YRS: CPT

## 2025-07-01 PROCEDURE — C1769: CPT

## 2025-07-01 PROCEDURE — 85027 COMPLETE CBC AUTOMATED: CPT

## 2025-07-01 PROCEDURE — C1894: CPT

## 2025-07-01 PROCEDURE — 80048 BASIC METABOLIC PNL TOTAL CA: CPT

## 2025-07-01 PROCEDURE — C1887: CPT

## 2025-07-01 PROCEDURE — 93454 CORONARY ARTERY ANGIO S&I: CPT

## 2025-07-01 RX ADMIN — Medication 100 MILLIEQUIVALENT(S): at 15:00

## 2025-07-01 RX ADMIN — Medication 75 MILLILITER(S): at 13:45

## 2025-07-01 RX ADMIN — Medication 500 MILLILITER(S): at 13:45

## 2025-07-01 NOTE — ASU DISCHARGE PLAN (ADULT/PEDIATRIC) - NS MD DC FALL RISK RISK
For information on Fall & Injury Prevention, visit: [email or web address] OR  [email or web address] OR  https://www.cdc.gov/steadi/patient.html

## 2025-07-01 NOTE — H&P CARDIOLOGY - HISTORY OF PRESENT ILLNESS
81 yo M with PMhx HLD presents for [facility] after having a + nuclear stress test. Patient follows with Dr Dunaway and had a nuclear stress test that revealed moderately reduced perfusion defect of moderate size in the inferolateral wall . Patient has no fever or chills, no N/V/D or abd pain.  79 yo M with PMhx HLD, h/o POBA in 1990 at [facility],  presents for [facility] after having a + nuclear stress test. Patient follows with Dr Dunaway and had a nuclear stress test that revealed moderately reduced perfusion defect of moderate size in the inferolateral wall . Patient has no fever or chills, no N/V/D or abd pain.

## 2025-07-01 NOTE — ASU DISCHARGE PLAN (ADULT/PEDIATRIC) - CARE PROVIDER_API CALL
Charles Dunaway  Cardiovascular Disease  [address] [address] [place] [id number]  Phone: [phone]  Fax: [phone]  Established Patient  Follow Up Time: Routine

## 2025-07-01 NOTE — ASU PATIENT PROFILE, ADULT - FALL HARM RISK - HARM RISK INTERVENTIONS

## 2025-07-01 NOTE — ASU DISCHARGE PLAN (ADULT/PEDIATRIC) - FINANCIAL ASSISTANCE
[facility] provides services at a reduced cost to those who are determined to be eligible through [facility]’s financial assistance program. Information regarding [facility]’s financial assistance program can be found by going to [email or web address] or by calling [phone].

## 2025-07-29 ENCOUNTER — APPOINTMENT (OUTPATIENT)
Dept: PULMONOLOGY | Facility: CLINIC | Age: 80
End: 2025-07-29

## 2025-08-01 ENCOUNTER — APPOINTMENT (OUTPATIENT)
Dept: ELECTROPHYSIOLOGY | Facility: CLINIC | Age: 80
End: 2025-08-01
Payer: MEDICARE

## 2025-08-01 ENCOUNTER — NON-APPOINTMENT (OUTPATIENT)
Age: 80
End: 2025-08-01

## 2025-08-01 VITALS
DIASTOLIC BLOOD PRESSURE: 88 MMHG | BODY MASS INDEX: 25.43 KG/M2 | HEART RATE: 74 BPM | HEIGHT: 67 IN | WEIGHT: 162 LBS | SYSTOLIC BLOOD PRESSURE: 192 MMHG | OXYGEN SATURATION: 97 %

## 2025-08-01 VITALS — SYSTOLIC BLOOD PRESSURE: 180 MMHG | DIASTOLIC BLOOD PRESSURE: 67 MMHG

## 2025-08-01 DIAGNOSIS — I49.3 VENTRICULAR PREMATURE DEPOLARIZATION: ICD-10-CM

## 2025-08-01 DIAGNOSIS — I10 ESSENTIAL (PRIMARY) HYPERTENSION: ICD-10-CM

## 2025-08-01 PROCEDURE — 93000 ELECTROCARDIOGRAM COMPLETE: CPT

## 2025-08-01 PROCEDURE — 99204 OFFICE O/P NEW MOD 45 MIN: CPT | Mod: 25

## 2025-08-01 RX ORDER — ASPIRIN 81 MG/1
81 TABLET, DELAYED RELEASE ORAL
Refills: 0 | Status: ACTIVE | COMMUNITY

## 2025-08-01 RX ORDER — ATORVASTATIN CALCIUM 10 MG/1
10 TABLET, FILM COATED ORAL
Refills: 0 | Status: ACTIVE | COMMUNITY

## 2025-08-01 RX ORDER — PANTOPRAZOLE SODIUM 40 MG/10ML
40 INJECTION, POWDER, FOR SOLUTION INTRAVENOUS
Refills: 0 | Status: ACTIVE | COMMUNITY